# Patient Record
(demographics unavailable — no encounter records)

---

## 2024-10-08 NOTE — HISTORY OF PRESENT ILLNESS
[FreeTextEntry1] : 13 yr c doing well did well in school this year.  at top of his class.  likes algebra 8th grade little exercise.  likes to read. improved diet this past year. sleeps well development appropriate no acute concerns. bowels good  has seen dentist

## 2024-10-08 NOTE — PHYSICAL EXAM

## 2024-10-08 NOTE — DISCUSSION/SUMMARY
[FreeTextEntry1] : Healthy 13 yr old routine care anticipatory guidance sleep hygiene discussed HPv and flu administered annual exam